# Patient Record
Sex: MALE | Race: ASIAN | NOT HISPANIC OR LATINO | URBAN - METROPOLITAN AREA
[De-identification: names, ages, dates, MRNs, and addresses within clinical notes are randomized per-mention and may not be internally consistent; named-entity substitution may affect disease eponyms.]

---

## 2018-12-26 ENCOUNTER — EMERGENCY (EMERGENCY)
Facility: HOSPITAL | Age: 58
LOS: 1 days | Discharge: HOME | End: 2018-12-26
Attending: EMERGENCY MEDICINE
Payer: COMMERCIAL

## 2018-12-26 VITALS
HEIGHT: 69 IN | SYSTOLIC BLOOD PRESSURE: 181 MMHG | DIASTOLIC BLOOD PRESSURE: 99 MMHG | OXYGEN SATURATION: 99 % | TEMPERATURE: 98 F | WEIGHT: 210.1 LBS | HEART RATE: 72 BPM | RESPIRATION RATE: 16 BRPM

## 2018-12-26 VITALS
RESPIRATION RATE: 18 BRPM | HEART RATE: 74 BPM | OXYGEN SATURATION: 98 % | DIASTOLIC BLOOD PRESSURE: 78 MMHG | SYSTOLIC BLOOD PRESSURE: 167 MMHG | TEMPERATURE: 98 F

## 2018-12-26 PROBLEM — Z00.00 ENCOUNTER FOR PREVENTIVE HEALTH EXAMINATION: Status: ACTIVE | Noted: 2018-12-26

## 2018-12-26 PROCEDURE — 70450 CT HEAD/BRAIN W/O DYE: CPT | Mod: 26

## 2018-12-26 PROCEDURE — 99284 EMERGENCY DEPT VISIT MOD MDM: CPT

## 2018-12-26 PROCEDURE — 70450 CT HEAD/BRAIN W/O DYE: CPT

## 2018-12-26 PROCEDURE — 72125 CT NECK SPINE W/O DYE: CPT

## 2018-12-26 PROCEDURE — 72125 CT NECK SPINE W/O DYE: CPT | Mod: 26

## 2018-12-26 RX ORDER — LIDOCAINE 4 G/100G
1 CREAM TOPICAL ONCE
Qty: 0 | Refills: 0 | Status: COMPLETED | OUTPATIENT
Start: 2018-12-26 | End: 2018-12-26

## 2018-12-26 RX ORDER — DIAZEPAM 5 MG
5 TABLET ORAL ONCE
Qty: 0 | Refills: 0 | Status: DISCONTINUED | OUTPATIENT
Start: 2018-12-26 | End: 2018-12-26

## 2018-12-26 RX ORDER — ASPIRIN/CALCIUM CARB/MAGNESIUM 324 MG
1 TABLET ORAL
Qty: 0 | Refills: 0 | COMMUNITY

## 2018-12-26 RX ORDER — DIAZEPAM 5 MG
1 TABLET ORAL
Qty: 4 | Refills: 0 | OUTPATIENT
Start: 2018-12-26

## 2018-12-26 RX ORDER — ACETAMINOPHEN 500 MG
975 TABLET ORAL ONCE
Qty: 0 | Refills: 0 | Status: COMPLETED | OUTPATIENT
Start: 2018-12-26 | End: 2018-12-26

## 2018-12-26 RX ADMIN — Medication 975 MILLIGRAM(S): at 17:21

## 2018-12-26 RX ADMIN — LIDOCAINE 1 PATCH: 4 CREAM TOPICAL at 19:52

## 2018-12-26 RX ADMIN — Medication 5 MILLIGRAM(S): at 19:52

## 2018-12-26 NOTE — ED PROVIDER NOTE - PLAN OF CARE
Rest, stay well hydrated.   Take Tylenol 650mg every 6 hours as needed.   May alternate with Motrin 600mg every 8 hours as needed. Take with food.   May keep lidoderm patch on for up to 12 hours. Do not use more than 1 in 24 hour period.  Take valium 5mg every 8 hours as needed for muscle spasms *** Caution drowsiness do not drive/drink Rest, stay well hydrated.   Take Tylenol 650mg every 6 hours as needed.   May alternate with Motrin 600mg every 8 hours as needed. Take with food.   May keep lidoderm patch on for up to 12 hours. Do not use more than 1 in a 24 hour period.  Take valium 5mg every 8 hours as needed for muscle spasms *** Caution drowsiness do not drive/drink alcohol while taking this medication.   Please follow up with PCP for further evaluation and imaging of thyroid nodule.  Bring copy of your printed results.    Return to ER for any new or worsening headaches, vision changes, vomiting, numbness/tingling, or any other concerning symptoms.

## 2018-12-26 NOTE — ED ADULT TRIAGE NOTE - CHIEF COMPLAINT QUOTE
Trip and fall Sunday night in bathroom, hit head on tile wall, no LOC. On aspirin 81 mg, takes it 3-4x/week.  Went to [facility] Monday, pt reports head CT was negative.  Pt here for continuing occipital head pain and neck pain. No dizziness, visual changes.

## 2018-12-26 NOTE — ED PROVIDER NOTE - MUSCULOSKELETAL NECK EXAM
no deformity, pain or tenderness. no restriction of movement/+ L paraspinal ttp, no midline spinal ttp, ROM of neck intact with mild reported stiffness

## 2018-12-26 NOTE — ED ADULT NURSE NOTE - CHIEF COMPLAINT QUOTE
Trip and fall Sunday night in bathroom, hit head on tile wall, no LOC. On aspirin 81 mg, takes it 3-4x/week.  Went to [facility] Monday, pt reports head CT was negative.  Pt here for continuing occipital head pain and neck pain. No dizziness, visual changes.

## 2018-12-26 NOTE — ED PROVIDER NOTE - OBJECTIVE STATEMENT
57yo M with PMH HTN presenting with HA since head injury 4 days ago. Pt reports he slipped and fell on bath mat in bathroom and hit his head on bath tub. No LOC. Able to get up after fall. Went to [facility] and had unremarkable CTH. Pt reports he has had constant 10/10 L occipital HA radiating to L neck since injury. Tried ibuprofen with no relief, last took this AM. Denies any dizziness, vision changes, CP, SOB, abd pain, n/v, urinary symptoms, numbness/tingling, weakness, unsteady gait, back pain. No blood thinner use. 59yo M with PMH HTN presenting with HA since head injury 4 days ago. Pt reports he slipped and fell on bath mat in bathroom and hit his head on bath tub. No LOC. Able to get up after fall. Went to [facility] and had unremarkable CTH. Pt reports he has had constant 10/10 L occipital HA radiating to L neck since injury. Tried ibuprofen with no relief, last took this AM. Denies any dizziness, increased fatigue, vision changes, CP, SOB, abd pain, n/v, urinary symptoms, numbness/tingling, weakness, unsteady gait, back pain. No blood thinner use.

## 2018-12-26 NOTE — ED PROVIDER NOTE - PROGRESS NOTE DETAILS
Pt reports his HA is a little better, however still with some neck stiffness. Discussed CT findings with patient including thyroid nodules and recommended f/u with PCP for sono and further evaluation. Will give muscle relaxer and likely d/c home. Patient not driving, with family at bedside. D/w Dr. Matthews. - Etelvina Macdonald, PA-C

## 2018-12-26 NOTE — ED PROVIDER NOTE - ENMT, MLM
Airway patent, Nasal mucosa clear. Mouth with normal mucosa. Throat has no vesicles, no oropharyngeal exudates and uvula is midline. No scalp hematoma/ecchymosis noted

## 2018-12-26 NOTE — ED ADULT NURSE NOTE - OBJECTIVE STATEMENT
Pt presents for eval after he fell 2 days ago and hit back and his head on floor.  He denies loc at that time.  He c/o pain at left side of occipital area,, no bruising or laceration  present  Pt is alert and interactive.  Did report slight blurring of vision, denies n/v.

## 2018-12-26 NOTE — ED PROVIDER NOTE - ATTENDING CONTRIBUTION TO CARE
59yo M with PMH HTN presenting with HA since head injury 4 days ago, hit back of head with neck pain, with rom, l sided paraspinal noted, no midline tend, nvi to b/l, ct head and cspine, analgesia, mild post concussive sts, on baby asa only, likely d.c home.

## 2018-12-26 NOTE — ED PROVIDER NOTE - NSFOLLOWUPINSTRUCTIONS_ED_ALL_ED_FT
Rest, stay well hydrated.   Take Tylenol 650mg every 6 hours as needed.   May alternate with Motrin 600mg every 8 hours as needed. Take with food.   May keep lidoderm patch on for up to 12 hours. Do not use more than 1 in a 24 hour period.  Take valium 5mg every 8 hours as needed for muscle spasms *** Caution drowsiness do not drive/drink alcohol while taking this medication.   Please follow up with PCP for further evaluation and imaging of thyroid nodule.  Bring copy of your printed results.    Return to ER for any new or worsening headaches, vision changes, vomiting, numbness/tingling, or any other concerning symptoms.

## 2018-12-26 NOTE — ED ADULT NURSE NOTE - NSIMPLEMENTINTERV_GEN_ALL_ED
Implemented All Fall Risk Interventions:  [place] to call system. Call bell, personal items and telephone within reach. Instruct patient to call for assistance. Room bathroom lighting operational. Non-slip footwear when patient is off stretcher. Physically safe environment: no spills, clutter or unnecessary equipment. Stretcher in lowest position, wheels locked, appropriate side rails in place. Provide visual cue, wrist band, yellow gown, etc. Monitor gait and stability. Monitor for mental status changes and reorient to person, place, and time. Review medications for side effects contributing to fall risk. Reinforce activity limits and safety measures with patient and family.

## 2018-12-26 NOTE — ED PROVIDER NOTE - CARE PLAN
Principal Discharge DX:	Acute intractable headache, unspecified headache type Principal Discharge DX:	Acute intractable headache, unspecified headache type  Assessment and plan of treatment:	Rest, stay well hydrated.   Take Tylenol 650mg every 6 hours as needed.   May alternate with Motrin 600mg every 8 hours as needed. Take with food.   May keep lidoderm patch on for up to 12 hours. Do not use more than 1 in 24 hour period.  Take valium 5mg every 8 hours as needed for muscle spasms *** Caution drowsiness do not drive/drink  Secondary Diagnosis:	Musculoskeletal pain  Secondary Diagnosis:	Thyroid nodule Principal Discharge DX:	Acute intractable headache, unspecified headache type  Assessment and plan of treatment:	Rest, stay well hydrated.   Take Tylenol 650mg every 6 hours as needed.   May alternate with Motrin 600mg every 8 hours as needed. Take with food.   May keep lidoderm patch on for up to 12 hours. Do not use more than 1 in a 24 hour period.  Take valium 5mg every 8 hours as needed for muscle spasms *** Caution drowsiness do not drive/drink alcohol while taking this medication.   Please follow up with PCP for further evaluation and imaging of thyroid nodule.  Bring copy of your printed results.    Return to ER for any new or worsening headaches, vision changes, vomiting, numbness/tingling, or any other concerning symptoms.  Secondary Diagnosis:	Musculoskeletal pain  Secondary Diagnosis:	Thyroid nodule

## 2019-08-16 NOTE — ED ADULT NURSE NOTE - NS ED NURSE LEVEL OF CONSCIOUSNESS AFFECT
I have reviewed discharge instructions with the patient. The patient verbalized understanding. Patient armband removed and shredded, script sent to pharmacy and verified with pt.  pt informed not to drive after medication given in ER. Pt brought to car with wheelchair and Grandfather driving.
Appropriate

## 2024-03-26 ENCOUNTER — EMERGENCY (EMERGENCY)
Facility: HOSPITAL | Age: 64
LOS: 1 days | Discharge: HOME | End: 2024-03-26
Attending: EMERGENCY MEDICINE
Payer: COMMERCIAL

## 2024-03-26 VITALS
HEIGHT: 69 IN | HEART RATE: 72 BPM | SYSTOLIC BLOOD PRESSURE: 210 MMHG | OXYGEN SATURATION: 97 % | WEIGHT: 214.95 LBS | TEMPERATURE: 98 F | DIASTOLIC BLOOD PRESSURE: 101 MMHG | RESPIRATION RATE: 18 BRPM

## 2024-03-26 VITALS
HEART RATE: 61 BPM | RESPIRATION RATE: 16 BRPM | OXYGEN SATURATION: 99 % | DIASTOLIC BLOOD PRESSURE: 112 MMHG | TEMPERATURE: 98 F | SYSTOLIC BLOOD PRESSURE: 215 MMHG

## 2024-03-26 LAB
ALBUMIN SERPL ELPH-MCNC: 4.1 G/DL — SIGNIFICANT CHANGE UP (ref 3.3–5)
ALP SERPL-CCNC: 74 U/L — SIGNIFICANT CHANGE UP (ref 40–120)
ALT FLD-CCNC: 18 U/L — SIGNIFICANT CHANGE UP (ref 10–45)
ANION GAP SERPL CALC-SCNC: 12 MMOL/L — SIGNIFICANT CHANGE UP (ref 5–17)
APPEARANCE UR: CLEAR — SIGNIFICANT CHANGE UP
AST SERPL-CCNC: 25 U/L — SIGNIFICANT CHANGE UP (ref 10–40)
BASOPHILS # BLD AUTO: 0.04 K/UL — SIGNIFICANT CHANGE UP (ref 0–0.2)
BASOPHILS NFR BLD AUTO: 0.7 % — SIGNIFICANT CHANGE UP (ref 0–2)
BILIRUB SERPL-MCNC: 0.3 MG/DL — SIGNIFICANT CHANGE UP (ref 0.2–1.2)
BILIRUB UR-MCNC: NEGATIVE — SIGNIFICANT CHANGE UP
BUN SERPL-MCNC: 14 MG/DL — SIGNIFICANT CHANGE UP (ref 7–23)
CALCIUM SERPL-MCNC: 9.6 MG/DL — SIGNIFICANT CHANGE UP (ref 8.4–10.5)
CHLORIDE SERPL-SCNC: 102 MMOL/L — SIGNIFICANT CHANGE UP (ref 96–108)
CO2 SERPL-SCNC: 23 MMOL/L — SIGNIFICANT CHANGE UP (ref 22–31)
COLOR SPEC: YELLOW — SIGNIFICANT CHANGE UP
CREAT SERPL-MCNC: 0.73 MG/DL — SIGNIFICANT CHANGE UP (ref 0.5–1.3)
DIFF PNL FLD: NEGATIVE — SIGNIFICANT CHANGE UP
EGFR: 103 ML/MIN/1.73M2 — SIGNIFICANT CHANGE UP
EOSINOPHIL # BLD AUTO: 0.12 K/UL — SIGNIFICANT CHANGE UP (ref 0–0.5)
EOSINOPHIL NFR BLD AUTO: 2.1 % — SIGNIFICANT CHANGE UP (ref 0–6)
GLUCOSE SERPL-MCNC: 175 MG/DL — HIGH (ref 70–99)
GLUCOSE UR QL: 250 MG/DL
HCT VFR BLD CALC: 38.8 % — LOW (ref 39–50)
HGB BLD-MCNC: 12.7 G/DL — LOW (ref 13–17)
IMM GRANULOCYTES NFR BLD AUTO: 0.5 % — SIGNIFICANT CHANGE UP (ref 0–0.9)
KETONES UR-MCNC: NEGATIVE MG/DL — SIGNIFICANT CHANGE UP
LEUKOCYTE ESTERASE UR-ACNC: NEGATIVE — SIGNIFICANT CHANGE UP
LIDOCAIN IGE QN: 46 U/L — SIGNIFICANT CHANGE UP (ref 7–60)
LYMPHOCYTES # BLD AUTO: 1.77 K/UL — SIGNIFICANT CHANGE UP (ref 1–3.3)
LYMPHOCYTES # BLD AUTO: 30.3 % — SIGNIFICANT CHANGE UP (ref 13–44)
MCHC RBC-ENTMCNC: 26.7 PG — LOW (ref 27–34)
MCHC RBC-ENTMCNC: 32.7 GM/DL — SIGNIFICANT CHANGE UP (ref 32–36)
MCV RBC AUTO: 81.7 FL — SIGNIFICANT CHANGE UP (ref 80–100)
MONOCYTES # BLD AUTO: 0.55 K/UL — SIGNIFICANT CHANGE UP (ref 0–0.9)
MONOCYTES NFR BLD AUTO: 9.4 % — SIGNIFICANT CHANGE UP (ref 2–14)
NEUTROPHILS # BLD AUTO: 3.33 K/UL — SIGNIFICANT CHANGE UP (ref 1.8–7.4)
NEUTROPHILS NFR BLD AUTO: 57 % — SIGNIFICANT CHANGE UP (ref 43–77)
NITRITE UR-MCNC: NEGATIVE — SIGNIFICANT CHANGE UP
NRBC # BLD: 0 /100 WBCS — SIGNIFICANT CHANGE UP (ref 0–0)
PH UR: 6.5 — SIGNIFICANT CHANGE UP (ref 5–8)
PLATELET # BLD AUTO: 224 K/UL — SIGNIFICANT CHANGE UP (ref 150–400)
POTASSIUM SERPL-MCNC: 4.9 MMOL/L — SIGNIFICANT CHANGE UP (ref 3.5–5.3)
POTASSIUM SERPL-SCNC: 4.9 MMOL/L — SIGNIFICANT CHANGE UP (ref 3.5–5.3)
PROT SERPL-MCNC: 7.4 G/DL — SIGNIFICANT CHANGE UP (ref 6–8.3)
PROT UR-MCNC: SIGNIFICANT CHANGE UP MG/DL
RBC # BLD: 4.75 M/UL — SIGNIFICANT CHANGE UP (ref 4.2–5.8)
RBC # FLD: 13.1 % — SIGNIFICANT CHANGE UP (ref 10.3–14.5)
SODIUM SERPL-SCNC: 137 MMOL/L — SIGNIFICANT CHANGE UP (ref 135–145)
SP GR SPEC: 1.02 — SIGNIFICANT CHANGE UP (ref 1–1.03)
UROBILINOGEN FLD QL: 0.2 MG/DL — SIGNIFICANT CHANGE UP (ref 0.2–1)
WBC # BLD: 5.84 K/UL — SIGNIFICANT CHANGE UP (ref 3.8–10.5)
WBC # FLD AUTO: 5.84 K/UL — SIGNIFICANT CHANGE UP (ref 3.8–10.5)

## 2024-03-26 PROCEDURE — 74176 CT ABD & PELVIS W/O CONTRAST: CPT | Mod: 26,MC

## 2024-03-26 PROCEDURE — 85025 COMPLETE CBC W/AUTO DIFF WBC: CPT

## 2024-03-26 PROCEDURE — 83690 ASSAY OF LIPASE: CPT

## 2024-03-26 PROCEDURE — 99284 EMERGENCY DEPT VISIT MOD MDM: CPT | Mod: 25

## 2024-03-26 PROCEDURE — 71045 X-RAY EXAM CHEST 1 VIEW: CPT

## 2024-03-26 PROCEDURE — 74176 CT ABD & PELVIS W/O CONTRAST: CPT | Mod: MC

## 2024-03-26 PROCEDURE — 71045 X-RAY EXAM CHEST 1 VIEW: CPT | Mod: 26

## 2024-03-26 PROCEDURE — 81003 URINALYSIS AUTO W/O SCOPE: CPT

## 2024-03-26 PROCEDURE — 87086 URINE CULTURE/COLONY COUNT: CPT

## 2024-03-26 PROCEDURE — 99284 EMERGENCY DEPT VISIT MOD MDM: CPT

## 2024-03-26 PROCEDURE — 80053 COMPREHEN METABOLIC PANEL: CPT

## 2024-03-26 RX ORDER — IBUPROFEN 200 MG
600 TABLET ORAL ONCE
Refills: 0 | Status: COMPLETED | OUTPATIENT
Start: 2024-03-26 | End: 2024-03-26

## 2024-03-26 RX ORDER — LIDOCAINE 4 G/100G
1 CREAM TOPICAL ONCE
Refills: 0 | Status: COMPLETED | OUTPATIENT
Start: 2024-03-26 | End: 2024-03-26

## 2024-03-26 RX ORDER — ACETAMINOPHEN 500 MG
975 TABLET ORAL ONCE
Refills: 0 | Status: COMPLETED | OUTPATIENT
Start: 2024-03-26 | End: 2024-03-26

## 2024-03-26 RX ADMIN — LIDOCAINE 1 PATCH: 4 CREAM TOPICAL at 21:52

## 2024-03-26 RX ADMIN — Medication 600 MILLIGRAM(S): at 21:50

## 2024-03-26 RX ADMIN — Medication 975 MILLIGRAM(S): at 21:50

## 2024-03-26 NOTE — ED PROVIDER NOTE - PHYSICAL EXAMINATION
Physical Exam:  Gen: Comfortable  Head: NCAT  HEENT:  no nasal congestion, normal conjunctiva, moist mucous membranes  Lung: Nonlabored breathing, CTAB  CV: RRR, no murmurs, rubs or gallops  Abd: Non-distended, no pain with palpation,  MSK: No visible deformities, moves all four extremities  Neuro: No focal motor deficits  Skin: Warm, well perfused, no visible rashes,  Psych: appropriate affect and mood Physical Exam:  Gen:  Well appearing, awake, alert, non-toxic appearing  Head: NCAT  HEENT: Normal conjunctiva, trachea midline, moist mucous membranes  Lung:  no respiratory distress,  speaking in full sentences  CV: RRR, no murmurs, rubs or gallops  Abd: Soft, non-tender, non-distended,   MSK: No visible deformities, moves all four extremities  Neuro: No focal motor deficits  Skin: Warm, well perfused, no visible rashes  Psych: appropriate affect and mood

## 2024-03-26 NOTE — ED ADULT NURSE NOTE - OBJECTIVE STATEMENT
63yM, A&Ox4, presents to the ED c/o lower back pain x 1 week. Pain worse when walking around, radiates down the back of his leg. Pt visited PCP and had xrays done, dx with arthritis. Pt states he was increasingly nervous that something was wrong so he came to ED today. Gross neuro intact, no difficulty speaking in complete sentences, pulses x 4, moving all extremities, abdomen soft nontender nondistended, skin intact. Pt denies fevers/chills, numbness/tingling, weakness, headache, dizziness, vision changes, cp, sob, cough, abd pain, n/v/d, dysuria, hematuria, bloody stools.

## 2024-03-26 NOTE — ED PROVIDER NOTE - PROGRESS NOTE DETAILS
Attending MD Glass: pending meds Nolvia Elias PGY1: patient resting comfortably in stretcher, no acute distress. Nolvia Elias PGY1: discussed labs/ UA and ct results with patient. Nolvia Elias PGY1: Pt stable.  Discussed results of workup, importance of follow-up with his PCP for physical therapy, and return precautions with patient who verbalized understanding and agreement. Pt had opportunity to ask questions and address concerns, and results of workup including lab and imaging, and incidental findings, were reviewed and provided in DC paperwork. Patient is medically clear for DC at this time.

## 2024-03-26 NOTE — ED PROVIDER NOTE - ATTENDING CONTRIBUTION TO CARE
Attending MD Glass: I personally have seen and examined this patient.  Resident note reviewed and agree on plan of care and except where noted.  See below for details.     Seen in [place]    63M with PMH/PSH including HTN, HLD, DM presents to the ED with L lower back pain, radiating to L hip and L lower abdomen.  Reports he was seen by his PMD and Rx'ed Cyclobenzaprine, denies improvement.  Denies OTC analgesic.  Reports with lifting leg in certain positions.  Denies trauma, fall, inciting event.  Denies known history of sciatica.  Denies numbness, weakness or tingling in extremities. Denies loss of urinary or bowel continence. Reports has known arthritis.  Reports had XRs with PMD but was not happy with "that answer" and came in for "a better idea".  Reports was told to start PT.  Denies chest pain, shortness of breath, abdominal pain, nausea, vomiting, diarrhea, urinary complaints.     Exam:   General: NAD  HENT: head NCAT, airway patent  Eyes: anicteric, no conjunctival injection   Lungs: lungs CTAB with good inspiratory effort, no wheezing, no rhonchi, no rales  Cardiac: +S1S2, no obvious m/r/g  GI: abdomen soft with +BS, minimal tenderness to LLQ, no rebound, no guarding, ND  : no CVAT  MSK: ranging neck and extremities freely, no midline tenderness, no tenderness to palpation of hip  Neuro: moving all extremities spontaneously, nonfocal  Psych: normal mood and affect     A/P: 63M with L lower back pain, L lower abdominal pain, QPA labs and CTAP noncon reviewed, no nephrolithiasis, no gross lesions of spine, will give analgesic

## 2024-03-26 NOTE — ED PROVIDER NOTE - PATIENT PORTAL LINK FT
You can access the FollowMyHealth Patient Portal offered by [facility] by registering at the following website: [email or web address]. By joining [employer]’s FollowMyHealth portal, you will also be able to view your health information using other applications (apps) compatible with our system.

## 2024-03-26 NOTE — ED PROVIDER NOTE - RAPID ASSESSMENT
63-year-old male presenting to the emergency department with 2 days of left flank pain with some radiation to the groin as well as down the left lower extremity.  No chest pain no shortness of breath.  Had seen his primary for this about a week ago who gave him a muscle relaxer which has not been helping with his symptoms.  No fevers no chills no urinary complaints at this time.  Patient was rapidly assessed via a telemedicine and/or role of Quick Triage Doctor; a limited history, physical exam and assessment was performed. The patient will be seen and further evaluated in the main emergency department. The remainder of care and evaluation will be conducted by the primary emergency medicine team. Receiving team will follow up on labs, imaging and serially reassess patient as indicated. All further decisions regarding patient care, evaluation and disposition are at the discretion of the receiving primary emergency department team. 63-year-old male presenting to the emergency department with 2 days of left flank pain with some radiation to the groin as well as down the left lower extremity.  No chest pain no shortness of breath.  Had seen his primary for this about a week ago who gave him a muscle relaxer which has not been helping with his symptoms.  No fevers no chills no urinary complaints at this time.  Patient was rapidly assessed via a telemedicine and/or role of Quick Triage Doctor; a limited history, physical exam and assessment was performed. The patient will be seen and further evaluated in the main emergency department. The remainder of care and evaluation will be conducted by the primary emergency medicine team. Receiving team will follow up on labs, imaging and serially reassess patient as indicated. All further decisions regarding patient care, evaluation and disposition are at the discretion of the receiving primary emergency department team.    Natasha Angelo MD (Attending): I was available for real-time consultation while the patient was evaluated by the PA/NP in the waiting room as part of a triage screening process but did not directly see or examine the patient at this time.

## 2024-03-26 NOTE — ED ADULT TRIAGE NOTE - CHIEF COMPLAINT QUOTE
Pt reports worsening L lower back/flank pain x 1 week a/w nighttime fevers. Denies urinary symptoms, inciting trauma

## 2024-03-26 NOTE — ED PROVIDER NOTE - NSFOLLOWUPINSTRUCTIONS_ED_ALL_ED_FT
You have been evaluated in the Emergency Department today for lower back and flank pain. Your evaluation did not show evidence of medical conditions requiring emergent intervention at this time.    Please schedule an appointment with your primary care physician. Continue with your physician's plan to get physical therapy.     Return to the Emergency Department if you experience worsening or uncontrolled pain, fevers 100.4°F or greater, recurrent vomiting, inability to tolerate food or fluids by mouth, bloody stools or vomit, black or tarry stools, or any other concerning symptoms. You have been evaluated in the Emergency Department today for lower back and flank pain. Your evaluation did not show evidence of medical conditions requiring emergent intervention at this time.    Please schedule an appointment with your primary care physician. Continue with your physician's plan to get physical therapy.     Your blood pressure was elevated in the Emergency room. Please follow up with your PCP in the next 24-48 hrs to further evaluate this.     Return to the Emergency Department if you experience worsening or uncontrolled pain, fevers 100.4°F or greater, recurrent vomiting, inability to tolerate food or fluids by mouth, bloody stools or vomit, black or tarry stools, or any other concerning symptoms.

## 2024-03-26 NOTE — ED ADULT NURSE NOTE - NS ED NURSE DC INFO COMPLEXITY
block) ([facility]    Adenocarcinoma of prostate ([facility]    Carcinoma of head of pancreas ([address]    Urinary tract infection    UTI (urinary tract infection)    Gross hematuria    Acute blood loss anemia    Metastasis from pancreatic cancer ([address]    Hemorrhagic cystitis    Mild malnutrition ([address]    Malnutrition (HCC)    Edema due to hypoalbuminemia       Plan: No indication for transfusion at this time, urine culture pending antibiotic therapy pending urine culture    Pardeep Earleton  4:24 AM 7/17/2019 Simple: Patient demonstrates quick and easy understanding/Straightforward: Basic instructions, no meds, no home treatment

## 2024-03-28 LAB
CULTURE RESULTS: SIGNIFICANT CHANGE UP
SPECIMEN SOURCE: SIGNIFICANT CHANGE UP